# Patient Record
Sex: FEMALE | Race: WHITE | NOT HISPANIC OR LATINO
[De-identification: names, ages, dates, MRNs, and addresses within clinical notes are randomized per-mention and may not be internally consistent; named-entity substitution may affect disease eponyms.]

---

## 2018-12-21 PROBLEM — Z00.00 ENCOUNTER FOR PREVENTIVE HEALTH EXAMINATION: Status: ACTIVE | Noted: 2018-12-21

## 2019-01-10 ENCOUNTER — APPOINTMENT (OUTPATIENT)
Dept: BREAST CENTER | Facility: CLINIC | Age: 69
End: 2019-01-10
Payer: MEDICARE

## 2019-01-10 VITALS
HEART RATE: 74 BPM | RESPIRATION RATE: 18 BRPM | SYSTOLIC BLOOD PRESSURE: 138 MMHG | BODY MASS INDEX: 21.53 KG/M2 | HEIGHT: 62.5 IN | WEIGHT: 120 LBS | DIASTOLIC BLOOD PRESSURE: 93 MMHG | OXYGEN SATURATION: 99 %

## 2019-01-10 PROCEDURE — 99203 OFFICE O/P NEW LOW 30 MIN: CPT

## 2019-01-31 ENCOUNTER — RESULT REVIEW (OUTPATIENT)
Age: 69
End: 2019-01-31

## 2019-04-05 ENCOUNTER — RESULT REVIEW (OUTPATIENT)
Age: 69
End: 2019-04-05

## 2019-07-23 PROBLEM — Z00.00 ENCOUNTER FOR PREVENTIVE HEALTH EXAMINATION: Noted: 2019-07-23

## 2019-08-14 ENCOUNTER — APPOINTMENT (OUTPATIENT)
Dept: HEMATOLOGY ONCOLOGY | Facility: CLINIC | Age: 69
End: 2019-08-14

## 2021-06-01 ENCOUNTER — RESULT REVIEW (OUTPATIENT)
Age: 71
End: 2021-06-01

## 2023-03-07 ENCOUNTER — APPOINTMENT (OUTPATIENT)
Dept: INTERNAL MEDICINE | Facility: CLINIC | Age: 73
End: 2023-03-07
Payer: MEDICARE

## 2023-03-07 PROCEDURE — 36415 COLL VENOUS BLD VENIPUNCTURE: CPT

## 2023-03-07 PROCEDURE — 99214 OFFICE O/P EST MOD 30 MIN: CPT | Mod: 25

## 2023-11-21 ENCOUNTER — LABORATORY RESULT (OUTPATIENT)
Age: 73
End: 2023-11-21

## 2024-01-08 ENCOUNTER — RESULT REVIEW (OUTPATIENT)
Age: 74
End: 2024-01-08

## 2024-04-16 ENCOUNTER — NON-APPOINTMENT (OUTPATIENT)
Age: 74
End: 2024-04-16

## 2024-04-17 ENCOUNTER — NON-APPOINTMENT (OUTPATIENT)
Age: 74
End: 2024-04-17

## 2024-04-17 ENCOUNTER — APPOINTMENT (OUTPATIENT)
Dept: BREAST CENTER | Facility: CLINIC | Age: 74
End: 2024-04-17
Payer: MEDICARE

## 2024-04-17 VITALS
WEIGHT: 122 LBS | HEART RATE: 76 BPM | BODY MASS INDEX: 21.89 KG/M2 | HEIGHT: 62.5 IN | DIASTOLIC BLOOD PRESSURE: 86 MMHG | SYSTOLIC BLOOD PRESSURE: 157 MMHG

## 2024-04-17 DIAGNOSIS — Z80.1 FAMILY HISTORY OF MALIGNANT NEOPLASM OF TRACHEA, BRONCHUS AND LUNG: ICD-10-CM

## 2024-04-17 DIAGNOSIS — Z82.0 FAMILY HISTORY OF EPILEPSY AND OTHER DISEASES OF THE NERVOUS SYSTEM: ICD-10-CM

## 2024-04-17 DIAGNOSIS — Z87.59 PERSONAL HISTORY OF OTHER COMPLICATIONS OF PREGNANCY, CHILDBIRTH AND THE PUERPERIUM: ICD-10-CM

## 2024-04-17 DIAGNOSIS — U07.1 COVID-19: ICD-10-CM

## 2024-04-17 DIAGNOSIS — Z85.828 PERSONAL HISTORY OF OTHER MALIGNANT NEOPLASM OF SKIN: ICD-10-CM

## 2024-04-17 DIAGNOSIS — Z87.891 PERSONAL HISTORY OF NICOTINE DEPENDENCE: ICD-10-CM

## 2024-04-17 DIAGNOSIS — Z23 ENCOUNTER FOR IMMUNIZATION: ICD-10-CM

## 2024-04-17 DIAGNOSIS — Z86.79 PERSONAL HISTORY OF OTHER DISEASES OF THE CIRCULATORY SYSTEM: ICD-10-CM

## 2024-04-17 DIAGNOSIS — N60.19 DIFFUSE CYSTIC MASTOPATHY OF UNSPECIFIED BREAST: ICD-10-CM

## 2024-04-17 DIAGNOSIS — R92.1 MAMMOGRAPHIC CALCIFICATION FOUND ON DIAGNOSTIC IMAGING OF BREAST: ICD-10-CM

## 2024-04-17 DIAGNOSIS — Z86.39 PERSONAL HISTORY OF OTHER ENDOCRINE, NUTRITIONAL AND METABOLIC DISEASE: ICD-10-CM

## 2024-04-17 DIAGNOSIS — Z86.59 PERSONAL HISTORY OF OTHER MENTAL AND BEHAVIORAL DISORDERS: ICD-10-CM

## 2024-04-17 DIAGNOSIS — Z12.31 ENCOUNTER FOR SCREENING MAMMOGRAM FOR MALIGNANT NEOPLASM OF BREAST: ICD-10-CM

## 2024-04-17 DIAGNOSIS — R92.30 DENSE BREASTS, UNSPECIFIED: ICD-10-CM

## 2024-04-17 PROCEDURE — 99204 OFFICE O/P NEW MOD 45 MIN: CPT

## 2024-04-17 RX ORDER — FLUTICASONE PROPIONATE 50 UG/1
50 SPRAY, METERED NASAL
Refills: 0 | Status: ACTIVE | COMMUNITY

## 2024-04-17 RX ORDER — LEVOTHYROXINE SODIUM 0.09 MG/1
88 TABLET ORAL
Refills: 0 | Status: ACTIVE | COMMUNITY

## 2024-04-17 RX ORDER — AMLODIPINE BESYLATE 5 MG/1
5 TABLET ORAL
Refills: 0 | Status: ACTIVE | COMMUNITY

## 2024-04-17 RX ORDER — MIRTAZAPINE 15 MG/1
15 TABLET, FILM COATED ORAL
Refills: 0 | Status: ACTIVE | COMMUNITY

## 2024-04-17 RX ORDER — PROGESTERONE 200 MG/1
200 CAPSULE ORAL
Refills: 0 | Status: ACTIVE | COMMUNITY

## 2024-04-17 NOTE — PHYSICAL EXAM
[Normocephalic] : normocephalic [Atraumatic] : atraumatic [Supple] : supple [No Supraclavicular Adenopathy] : no supraclavicular adenopathy [No Cervical Adenopathy] : no cervical adenopathy [No Thyromegaly] : no thyromegaly [Normal Sinus Rhythm] : normal sinus rhythm [Examined in the supine and seated position] : examined in the supine and seated position [No dominant masses] : no dominant masses in right breast  [No dominant masses] : no dominant masses left breast [No Nipple Retraction] : no left nipple retraction [No Nipple Discharge] : no left nipple discharge [No Axillary Lymphadenopathy] : no left axillary lymphadenopathy [No Edema] : no edema [No Rashes] : no rashes [No Ulceration] : no ulceration [de-identified] : +FC tissue NSF  [de-identified] : +core Bx site L 12:00 +FC tissue NSF

## 2024-04-18 PROBLEM — Z82.0 FAMILY HISTORY OF PARKINSON'S DISEASE: Status: ACTIVE | Noted: 2024-04-18

## 2024-04-18 PROBLEM — Z23 COVID-19 VACCINE ADMINISTERED: Status: RESOLVED | Noted: 2024-04-18 | Resolved: 2024-04-18

## 2024-04-18 PROBLEM — Z86.39 HISTORY OF HIGH CHOLESTEROL: Status: RESOLVED | Noted: 2024-04-18 | Resolved: 2024-04-18

## 2024-04-18 PROBLEM — Z87.59 HISTORY OF ECTOPIC PREGNANCY: Status: RESOLVED | Noted: 2024-04-18 | Resolved: 2024-04-18

## 2024-04-18 PROBLEM — Z80.1 FAMILY HISTORY OF LUNG CANCER: Status: ACTIVE | Noted: 2024-04-18

## 2024-04-18 PROBLEM — Z85.828: Status: RESOLVED | Noted: 2024-04-18 | Resolved: 2024-04-18

## 2024-04-18 PROBLEM — U07.1 COVID-19 VIRUS DETECTED: Status: RESOLVED | Noted: 2024-04-18 | Resolved: 2024-04-18

## 2024-04-18 PROBLEM — Z87.891 FORMER SMOKER: Status: ACTIVE | Noted: 2024-04-18

## 2024-05-09 ENCOUNTER — RESULT REVIEW (OUTPATIENT)
Age: 74
End: 2024-05-09

## 2024-05-10 ENCOUNTER — TRANSCRIPTION ENCOUNTER (OUTPATIENT)
Age: 74
End: 2024-05-10

## 2024-05-10 ENCOUNTER — RESULT REVIEW (OUTPATIENT)
Age: 74
End: 2024-05-10

## 2024-05-10 ENCOUNTER — APPOINTMENT (OUTPATIENT)
Dept: BREAST CENTER | Facility: HOSPITAL | Age: 74
End: 2024-05-10

## 2024-05-16 ENCOUNTER — APPOINTMENT (OUTPATIENT)
Dept: BREAST CENTER | Facility: CLINIC | Age: 74
End: 2024-05-16
Payer: MEDICARE

## 2024-05-16 DIAGNOSIS — N60.92 UNSPECIFIED BENIGN MAMMARY DYSPLASIA OF LEFT BREAST: ICD-10-CM

## 2024-05-16 PROCEDURE — 99024 POSTOP FOLLOW-UP VISIT: CPT

## 2024-05-16 NOTE — HISTORY OF PRESENT ILLNESS
[FreeTextEntry1] : S/P L Br BX w/ NL (5/10/24): No resid ADH, Bx site ID'ed Pt w/ L ADH detected as calc's on Scr Mammo (3/25/24) Mammo/Sono (3/25/24): ED, +cluster calc's L 12:00 > add'l views rec'ed L dx'ic Mammo (3/28/24): ED, +5mm cluster calc's L 12:00 S/P L Stereot Bx (L 12:00)(4/2/24): +ADH asso w/ calc's S/P R Sono Core Bx (R 12:00)(6/9/14): Benign UDH/PASH +Ashkenazi Ancestry  On HRT >25yrs (estradion patch/oral progesterone) Colonoscopy (?2017): "WNL" > 10yrs  PAP/Pelvic (2013): "WNL"  +h/o COVID x 2 (2020, 3/24) > paxlovid > recovered No other Breast Surgery, Breast Procedures or Nipple Discharge.  No FH Breast, Ovarian, Pancreatic Cancer or Melanoma.  No MH/FH changes. Taking Ca/D. ROS reviewed/discussed. Last Bone Densitometry (1/24): +osteopenia w// decrease MRI (4/16/24): NSF

## 2024-05-16 NOTE — PHYSICAL EXAM
[de-identified] : Pt w/o c/o Incision C&D. Min induration/ecchymosis. PDS d/c'ed. Stable. Pathology discussed. Copy of Report give to patient.

## 2024-12-05 ENCOUNTER — RX RENEWAL (OUTPATIENT)
Age: 74
End: 2024-12-05

## 2025-06-04 ENCOUNTER — RESULT REVIEW (OUTPATIENT)
Age: 75
End: 2025-06-04

## 2025-07-01 ENCOUNTER — NON-APPOINTMENT (OUTPATIENT)
Age: 75
End: 2025-07-01

## 2025-07-03 ENCOUNTER — APPOINTMENT (OUTPATIENT)
Dept: BREAST CENTER | Facility: CLINIC | Age: 75
End: 2025-07-03
Payer: MEDICARE

## 2025-07-03 VITALS
HEIGHT: 62.5 IN | BODY MASS INDEX: 22.16 KG/M2 | SYSTOLIC BLOOD PRESSURE: 118 MMHG | HEART RATE: 81 BPM | DIASTOLIC BLOOD PRESSURE: 83 MMHG | WEIGHT: 123.5 LBS

## 2025-07-03 PROCEDURE — 99213 OFFICE O/P EST LOW 20 MIN: CPT
